# Patient Record
Sex: FEMALE | Race: BLACK OR AFRICAN AMERICAN
[De-identification: names, ages, dates, MRNs, and addresses within clinical notes are randomized per-mention and may not be internally consistent; named-entity substitution may affect disease eponyms.]

---

## 2022-06-09 ENCOUNTER — HOSPITAL ENCOUNTER (OUTPATIENT)
Dept: HOSPITAL 19 - SDCO | Age: 47
LOS: 1 days | Discharge: HOME | End: 2022-06-10
Attending: OBSTETRICS & GYNECOLOGY
Payer: COMMERCIAL

## 2022-06-09 VITALS — WEIGHT: 162.26 LBS | BODY MASS INDEX: 25.47 KG/M2 | HEIGHT: 67.01 IN

## 2022-06-09 VITALS — SYSTOLIC BLOOD PRESSURE: 123 MMHG | TEMPERATURE: 98.4 F | HEART RATE: 82 BPM | DIASTOLIC BLOOD PRESSURE: 78 MMHG

## 2022-06-09 VITALS — HEART RATE: 68 BPM | DIASTOLIC BLOOD PRESSURE: 86 MMHG | SYSTOLIC BLOOD PRESSURE: 134 MMHG

## 2022-06-09 VITALS — HEART RATE: 86 BPM | DIASTOLIC BLOOD PRESSURE: 82 MMHG | SYSTOLIC BLOOD PRESSURE: 136 MMHG

## 2022-06-09 VITALS — SYSTOLIC BLOOD PRESSURE: 126 MMHG | DIASTOLIC BLOOD PRESSURE: 81 MMHG | TEMPERATURE: 98.4 F | HEART RATE: 81 BPM

## 2022-06-09 VITALS — DIASTOLIC BLOOD PRESSURE: 78 MMHG | SYSTOLIC BLOOD PRESSURE: 136 MMHG | HEART RATE: 78 BPM

## 2022-06-09 VITALS — DIASTOLIC BLOOD PRESSURE: 86 MMHG | SYSTOLIC BLOOD PRESSURE: 128 MMHG | HEART RATE: 76 BPM

## 2022-06-09 VITALS — HEART RATE: 78 BPM | SYSTOLIC BLOOD PRESSURE: 138 MMHG | DIASTOLIC BLOOD PRESSURE: 76 MMHG

## 2022-06-09 VITALS — HEART RATE: 80 BPM | TEMPERATURE: 97.9 F | SYSTOLIC BLOOD PRESSURE: 117 MMHG | DIASTOLIC BLOOD PRESSURE: 82 MMHG

## 2022-06-09 VITALS — DIASTOLIC BLOOD PRESSURE: 88 MMHG | SYSTOLIC BLOOD PRESSURE: 134 MMHG | HEART RATE: 80 BPM

## 2022-06-09 VITALS — TEMPERATURE: 97.2 F | SYSTOLIC BLOOD PRESSURE: 144 MMHG | HEART RATE: 87 BPM | DIASTOLIC BLOOD PRESSURE: 90 MMHG

## 2022-06-09 DIAGNOSIS — D25.1: ICD-10-CM

## 2022-06-09 DIAGNOSIS — Z87.891: ICD-10-CM

## 2022-06-09 DIAGNOSIS — D25.2: Primary | ICD-10-CM

## 2022-06-09 DIAGNOSIS — N83.02: ICD-10-CM

## 2022-06-09 DIAGNOSIS — N94.6: ICD-10-CM

## 2022-06-09 LAB — HCG SERPL QL: NEGATIVE

## 2022-06-09 NOTE — NUR
2155 Dangled on edge of bed. Bright red blood oozing from right abdominal port
site. Reinforced with 4x4 and bandaid x2. Bleeding slows. Pt ambulatory in
halls.

## 2022-06-10 VITALS — DIASTOLIC BLOOD PRESSURE: 75 MMHG | HEART RATE: 81 BPM | SYSTOLIC BLOOD PRESSURE: 129 MMHG | TEMPERATURE: 98.7 F

## 2022-06-10 VITALS — SYSTOLIC BLOOD PRESSURE: 130 MMHG | TEMPERATURE: 98 F | DIASTOLIC BLOOD PRESSURE: 77 MMHG | HEART RATE: 80 BPM

## 2022-06-10 NOTE — NUR
Initial visit; Patient is a delightful girl who states with a smile that all
is going well. Family is present offering support and love.  offered
God's blessings to Amanda.

## 2022-06-10 NOTE — NUR
0930-Reviewed discharge instructions and updated on new prescriptions and
appointments to follow up at. Denies questions.
0938-Ambulatory off unit with spouse.

## 2022-06-10 NOTE — NUR
0730-In to see patient. Dr. Alvarado already rounding just left room. Orders to
discontinue golden and patient may discharge following void.